# Patient Record
Sex: MALE | URBAN - METROPOLITAN AREA
[De-identification: names, ages, dates, MRNs, and addresses within clinical notes are randomized per-mention and may not be internally consistent; named-entity substitution may affect disease eponyms.]

---

## 2024-08-02 ENCOUNTER — APPOINTMENT (OUTPATIENT)
Dept: ORTHOPEDIC SURGERY | Facility: CLINIC | Age: 74
End: 2024-08-02
Payer: COMMERCIAL

## 2024-08-02 ENCOUNTER — HOSPITAL ENCOUNTER (OUTPATIENT)
Dept: RADIOLOGY | Facility: CLINIC | Age: 74
Discharge: HOME | End: 2024-08-02
Payer: MEDICARE

## 2024-08-02 DIAGNOSIS — M54.50 LOW BACK PAIN, UNSPECIFIED BACK PAIN LATERALITY, UNSPECIFIED CHRONICITY, UNSPECIFIED WHETHER SCIATICA PRESENT: ICD-10-CM

## 2024-08-02 DIAGNOSIS — M48.07 SPINAL STENOSIS OF LUMBOSACRAL REGION: Primary | ICD-10-CM

## 2024-08-02 PROCEDURE — 72110 X-RAY EXAM L-2 SPINE 4/>VWS: CPT | Performed by: RADIOLOGY

## 2024-08-02 PROCEDURE — 72110 X-RAY EXAM L-2 SPINE 4/>VWS: CPT

## 2024-08-02 PROCEDURE — 99204 OFFICE O/P NEW MOD 45 MIN: CPT | Performed by: EMERGENCY MEDICINE

## 2024-08-02 NOTE — PROGRESS NOTES
Subjective   Jaspreet Martínez is a 74 y.o. male who presents for Pain and Sciatica of the Lower Back    HPI  75 yo presenting to [facility] for evaluation of unchanged lower lumbar back discomfort that's been a chronic issue for pt, he was most recently seen approximately 1.5 yrs ago by  pain management and has undergone what sounds like facet injections of both sides of his lumbar spine, as well as radio frequency ablations bilaterally.  He reports prior to those interventions he was seeing an orthopedic surgeon who was doing lumbar spine injections in clinic and he was getting maybe 3 to 5 weeks of relief.  He reports he got minimal relief from the radiofrequency ablation, less than a week.  He is presenting today for persistent but unchanged symptoms looking to get another spine injection.  He has never tried physical therapy.  He reports taking Aleve approximately every other day.  He denies any night pain, numbness weakness bowel or bladder dysfunction.      ROS: All pertinent positive symptoms are included in the history of present illness.    All other systems have been reviewed and are negative and noncontributory to this patient's current ailments.    Objective     There were no vitals filed for this visit.    Physical Exam  [GENERAL]: Comfortable, in no acute distress, not toxic appearing  [NEURO]: Awake, alert, moves all extremities spontaneously and without difficulty  [EYES]: EOMI and nonpainful, pupils normal size and symmetric  [ENMT]: Moist mucous membranes.  [CV]: Well-perfused extremities, no peripheral edema, no asymmetrical extremity edema  [RESP]: Unlabored respiratory effort, no distress or tachypnea, no retractions  [ABD]: Soft, nondistended, no guarding.  [MSK]:     Lumbar Spine Exam:  Normal gait  No gross spinal deformity observed.  No midline ttp. No step-offs.  NTTP along paraspinals, gluteal muscles, SI joint  Normal flexion and extension. Normal hip flexion.   [5]/5 strength of hip  flexion (L2/L3), knee extension (L4), ankle DF (L4), EHL (L5), ankle PF (S1)    Hip Exam:  NTTP over greater trochanter, glute tendons, proximal ITB, ischial tuberosity  Active flexion >90 degrees with grossly normal extension, ,abduction, adduction, IR and ER.  [5]/5 strength of bilateral hip flexion, abduction, & adduction  [ + ] MALENA pain referred to low back/L hip, L>R    IMAGING:  Xrays of lumbar spine obtained on 8/2 reviewed and independently interpreted as:  No acute fractures. No spondylolisthesis. Degenerative changes, especially noted at L3/L4 with decreased intervertebral disc space and noteable degenerative changes to spinous processes.      Assessment/Plan   Problem List Items Addressed This Visit    None  Visit Diagnoses       Low back pain, unspecified back pain laterality, unspecified chronicity, unspecified whether sciatica present        Relevant Orders    XR lumbar spine complete 4+ views          Dx:  Lumbar back pain due to degenerative disc disease    Discussed options for management of this condition and made shared decision making for the selected treatment listed below.    - Today's treatment plan: PT referral. Referral to [facility]. New pain management referral placed.  - Work/exercise limits: No specific restrictions. All activities as tolerated.   - Follow-up: As needed if begins to have any hip discomfort or interested in pursuing CSI for L hip if begins to have pain with activites    All questions answered and patient is agreeable to plan of care.

## 2024-08-29 ENCOUNTER — OFFICE VISIT (OUTPATIENT)
Dept: PAIN MEDICINE | Facility: HOSPITAL | Age: 74
End: 2024-08-29
Payer: MEDICARE

## 2024-08-29 VITALS — BODY MASS INDEX: 22.35 KG/M2 | HEIGHT: 72 IN | WEIGHT: 165 LBS

## 2024-08-29 DIAGNOSIS — M48.07 SPINAL STENOSIS OF LUMBOSACRAL REGION: ICD-10-CM

## 2024-08-29 PROCEDURE — 1125F AMNT PAIN NOTED PAIN PRSNT: CPT | Performed by: ANESTHESIOLOGY

## 2024-08-29 PROCEDURE — 99214 OFFICE O/P EST MOD 30 MIN: CPT | Performed by: ANESTHESIOLOGY

## 2024-08-29 PROCEDURE — 99204 OFFICE O/P NEW MOD 45 MIN: CPT | Performed by: ANESTHESIOLOGY

## 2024-08-29 PROCEDURE — 3008F BODY MASS INDEX DOCD: CPT | Performed by: ANESTHESIOLOGY

## 2024-08-29 PROCEDURE — 1159F MED LIST DOCD IN RCRD: CPT | Performed by: ANESTHESIOLOGY

## 2024-08-29 RX ORDER — ASPIRIN 81 MG/1
81 TABLET ORAL DAILY
COMMUNITY

## 2024-08-29 RX ORDER — VIT A/VIT C/VIT E/ZINC/COPPER 4296-226
CAPSULE ORAL
COMMUNITY

## 2024-08-29 ASSESSMENT — PAIN SCALES - GENERAL: PAINLEVEL: 7

## 2024-08-29 NOTE — PROGRESS NOTES
Chief Complaint   Patient presents with    Back Pain    Extremity Pain     Subjective   Patient ID: Jaspreet Martínez is a 74 y.o. male with a past medical history of chronic lower back pain who presents as a new patient referred by orthopedics for lower back pain.      HPI:   Jaspreet Be is a 74-year-old male with a past medical history of chronic lower back pain who presents as a new patient referred by orthopedics for lower back pain.  Referring physician is Dr. Marquez.  Patient reports that he has had back pain since 2022.  Patient was seeing a pain physician here at [facility] in 2022 in regards to his back pain.  He reports that he had 12 injections in his back at that time and they provided maybe a couple weeks of relief at most.  He also underwent lumbar RFA without any significant relief at that time.  Patient reports that since then he has been able to live with his back pain however over the last 3 to 4 months his back pain is been significantly worse.  Patient reports that his back pain can be as bad as a 8-10 out of 10 pain.  It is both sharp and dull like pain.  It radiates to his bilateral lower extremities right worse than left.  The patient is extremely active and he is an avid golfer golfing multiple times per week.  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  Patient reports that he has trialed gabapentin in the past without any significant relief.  He will sometimes take Aleve and that helps his pain, down but does not take it away.  Patient reports that the pain inhibits his ability to play golf and do his martial arts without significant pain.  He usually is able to fly through however he has noticed that he is not able to perform as well and golf.  Patient also reports that sometimes he has difficulty sleeping at night due to the pain.  Patient reports that his pain is alleviated by leaning forward and he has noticed that when he is  at the grocery store pain is much better when he is leaning on a shopping cart.  Going up stairs is a lot more comfortable for him than going downstairs.  Patient reports that the most comfortable position is sitting and leaning forward or laying flat.  The only blood thinner he takes his aspirin 81.  He does not have any other significant medical problems.  Patient recently saw a Orthopedics/sports medicine, Dr. Marquez, who told him that it spinal stenosis and referred him to us for consideration of mild.    MRI lumbar spine from 2022 demonstrates multilevel moderate canal and neuroforaminal stenosis specifically at L3-L4 and L4-L5 with ligamentum hypertrophy worse at L4-L5.    Physical Therapy:  Patient has completed multiple rounds of physical therapy in the past and continues to do exercises and stretches at home on a daily basis.  Patient is extremely active and thus exercises and stretching with Lithuanian jujitsu  Other Conservative Measures he has tried: TENS, Heating Pad, and Injections  Classes of medications tried in the past: Acetaminophen, NSAIDs, and Topical agents        Review of Systems   13-point ROS done and negative except for HPI.     Current Outpatient Medications   Medication Instructions    aspirin 81 mg, oral, Daily    vitamins A,C,E-zinc-copper (ICaps AREDS) 4,296 mcg-226 mg-90 mg capsule oral       No past medical history on file.     No past surgical history on file.     No family history on file.     Not on File     Objective     There were no vitals filed for this visit.     Physical Exam  General: NAD, well groomed, well nourished  Eyes: Non-icteric sclera, EOMI  Ears, Nose, Mouth, and Throat: External ears and nose appear to be without deformity or rash. No lesions or masses noted. Hearing is grossly intact.   Neck: Trachea midline  Respiratory: Nonlabored breathing   Cardiovascular: no peripheral edema   Skin: No rashes or open lesions/ulcers identified on skin.    Back:   Palpation:  No tenderness to palpation over lumbar paraspinous muscles.   Straight leg raise: does not reproduce their pain, bilaterally   MALENA Maneuver does not reproduce pain bilaterally  Patient reports improvement in pain with flexion of lumbar spine and worsening pain with extension of lumbar spine.  On exam patient was seen sitting in chair leaning slightly forward due to comfort.    Hip: No pain over greater trochanters. and Pain not reproduced with hip internal/external rotation.     Neurologic:   Cranial nerves grossly intact.   Strength 5/5 and symmetric plantar/dorsiflexion   Sensation: Normal to light touch throughout, pinprick  intact throughout.  DTRs:normal and symmetric throughout  Mohr: absent    Psychiatric: Alert, orientation to person, place, and time. Cooperative.    Imaging personally reviewed and independently interpreted:  MRI Lumbar 6/9/22  FINDINGS:     Vertebral body height and alignment is maintained.     There are minimal bulges at each of the L1, L2, and L5 disc levels without  significant thecal sac compression.     There is more moderate disc degeneration with broad-based bulging as well as  facet arthropathy at both L3-4 and L4-5 causing a moderate degree of canal  narrowing and thecal sac compression. Moderate neural foramen narrowing  noted at these levels.     IMPRESSION: Disc bulging with facet arthropathy causing moderate canal and  neural foramen narrowing at L3-4 and L4-5.     Small bulges at L1, L2, and L5.      Assessment/Plan   Jaspreet Martínez is a 74-year-old male with a past medical history of chronic lumbar back pain.  Patient reports that the back pain started 2022 and he was seeing a pain physician here at [facility].  Patient reports that he had 12 injections in his back and a lumbar radiofrequency ablation that did not help significantly with his pain.  Patient reports that he manage to live with his pain for about a year or 2 and then the pain started to return a few months  ago.  Patient reports that the pain is severe 8 out of 10 pain that is worse with lumbar extension and better with lumbar flexion.  Patient reports that he does have some radicular symptoms down his right lower extremity.  On exam the patient does not have any significant findings other than he was seen sitting in a chair leaning forward due to comfort.  He also has significant improvement in his pain with lumbar spinal flexion and worsening pain with lumbar extension.MRI lumbar spine from 2022 demonstrates multilevel moderate canal and neuroforaminal stenosis specifically at L3-L4 and L4-L5 with ligamentum hypertrophy worse at L4-L5.  Patient likely has lumbar spinal stenosis with neurogenic claudication.  Given significant findings on MRI from the 22 of multiple areas of canal stenosis specifically at L3-L4 and L4-L5 with ligamentum hypertrophy worst at L4-L5 we discussed the MILD procedure with the patient.  The patient is extremely interested in this procedure and would like to move forward.  Patient is not wanting to pursue surgery at this time.    Plan:  -We gave the patient information about the MILD procedure, and the patient would like to move forward with scheduling the MILD procedure.    The patient has failed treatment with : Physical therapy , alternative therapies, injections, and have significant limitations of their quality of life due to the pain    We discussed  the risks, benefits and alternatives of the procedure including but not limited to: , Epidural hematoma, Post-dural puncture headache, Lack of efficacy , Transiently worsening pain , Bleeding, Infection , and Nerve Damage    Follow up: After procedure    The patient was invited to contact us back anytime with any questions or concerns and follow-up with us in the office as needed.     Diagnoses and all orders for this visit:  Spinal stenosis of lumbosacral region  -     Referral to Pain Medicine      This note was generated with the aid of  dictation software, there may be typos despite my attempts at proofreading.   The patient was discussed and seen with Dr. Velasquez.  Abundio Sabillon MD  PGY-5  Interventional Pain Fellow

## 2024-09-19 DIAGNOSIS — M54.16 LUMBAR RADICULOPATHY: ICD-10-CM

## 2024-10-07 DIAGNOSIS — G89.18 POSTOPERATIVE PAIN: ICD-10-CM

## 2024-10-10 NOTE — H&P
Pain Management H&P    History Of Present Illness  Jaspreet Martínez is a 74 y.o. male presents for procedure state below. Endorses no changes in past medical history or medical health since last seen in clinic. Patient stopped taking his aspirin 7 days ago.     Past Medical History  He has no past medical history on file.    Surgical History  He has no past surgical history on file.     Social History  He reports that he has been smoking cigarettes. He does not have any smokeless tobacco history on file. No history on file for alcohol use and drug use.    Family History  No family history on file.     Allergies  Patient has no allergy information on record.    Review of Symptoms:   Constitutional: Negative for chills, diaphoresis or fever  HENT: Negative for neck swelling  Eyes:.  Negative for eye pain  Respiratory:.  Negative for cough, shortness of breath or wheezing    Cardiovascular:.  Negative for chest pain or palpitations  Gastrointestinal:.  Negative for abdominal pain, nausea and vomiting  Genitourinary:.  Negative for urgency  Musculoskeletal:  Positive for back pain. Positive for joint pain. Denies falls within the past 3 months.  Skin: Negative for wounds or itching   Neurological: Negative for dizziness, seizures, loss of consciousness and weakness  Endo/Heme/Allergies: Does not bruise/bleed easily  Psychiatric/Behavioral: Negative for depression. The patient does not appear anxious.      Pre-sedation Evaluation  ASA class 2  Mallampati score 2     PHYSICAL EXAM  Vitals signs reviewed  Constitutional:       General: Not in acute distress     Appearance: Normal appearance. Not ill-appearing.  HENT:     Head: Normocephalic and atraumatic  Eyes:     Conjunctiva/sclera: Conjunctivae normal  Cardiovascular:     Rate and Rhythm: Normal rate and regular rhythm  Pulmonary:     Effort: No respiratory distress  Abdominal:     Palpations: Abdomen is soft  Musculoskeletal: CHAVEZ  Skin:     General: Skin is warm and  dry  Neurological:     General: No focal deficit present  Psychiatric:         Mood and Affect: Mood normal         Behavior: Behavior normal     Last Recorded Vitals  BP (!) 187/104   Pulse 79   Temp 36 °C (96.8 °F) (Temporal)   Resp 16   Wt 74.8 kg (165 lb)   SpO2 100%     Relevant Results  Current Outpatient Medications   Medication Instructions    aspirin 81 mg, oral, Daily    vitamins A,C,E-zinc-copper (ICaps AREDS) 4,296 mcg-226 mg-90 mg capsule oral       No results found for this or any previous visit from the past 1000 days.     No image results found.       1. Lumbar radiculopathy  FL pain management    FL pain management    Minimally Invasive Lumbar Decompression (MILD)    Minimally Invasive Lumbar Decompression (MILD)      2. Postoperative pain             ASSESSMENT/PLAN  Jaspreet Martínez is a 74 y.o. male here for MILD procedure    Patient denies any recent antibiotic use or infections, denies any blood thinner use, and denies contrast or local anesthetic allergies     Risks, benefits, alternatives discussed. All questions answered to the best of my ability. Patient agrees to proceed.      Our plan is as follows:  - Proceed with aforementioned procedure          [place] DO   Pain fellow

## 2024-10-11 ENCOUNTER — HOSPITAL ENCOUNTER (OUTPATIENT)
Facility: HOSPITAL | Age: 74
Discharge: HOME | End: 2024-10-11
Payer: MEDICARE

## 2024-10-11 VITALS
HEIGHT: 72 IN | TEMPERATURE: 97.2 F | SYSTOLIC BLOOD PRESSURE: 166 MMHG | BODY MASS INDEX: 22.35 KG/M2 | HEART RATE: 76 BPM | DIASTOLIC BLOOD PRESSURE: 83 MMHG | WEIGHT: 165 LBS | RESPIRATION RATE: 18 BRPM | OXYGEN SATURATION: 97 %

## 2024-10-11 DIAGNOSIS — G89.18 POSTOPERATIVE PAIN: ICD-10-CM

## 2024-10-11 DIAGNOSIS — M54.16 LUMBAR RADICULOPATHY: ICD-10-CM

## 2024-10-11 PROCEDURE — 2500000005 HC RX 250 GENERAL PHARMACY W/O HCPCS: Performed by: ANESTHESIOLOGY

## 2024-10-11 PROCEDURE — 2500000004 HC RX 250 GENERAL PHARMACY W/ HCPCS (ALT 636 FOR OP/ED): Performed by: ANESTHESIOLOGY

## 2024-10-11 PROCEDURE — C1889 IMPLANT/INSERT DEVICE, NOC: HCPCS

## 2024-10-11 PROCEDURE — 0275T HC PERCUT LAMINOTOMY/LAMINECTOMY MILD PROCEDURE: CPT | Performed by: ANESTHESIOLOGY

## 2024-10-11 PROCEDURE — 3700000013 HC SEDATION LEVEL 5+ TIME - EACH ADDITIONAL 15 MINUTES

## 2024-10-11 PROCEDURE — 2550000001 HC RX 255 CONTRASTS: Performed by: ANESTHESIOLOGY

## 2024-10-11 PROCEDURE — 2720000007 HC OR 272 NO HCPCS

## 2024-10-11 PROCEDURE — 3700000012 HC SEDATION LEVEL 5+ TIME - INITIAL 15 MINUTES 5/> YEARS

## 2024-10-11 RX ORDER — LIDOCAINE HYDROCHLORIDE 5 MG/ML
INJECTION, SOLUTION INFILTRATION; INTRAVENOUS
Status: COMPLETED | OUTPATIENT
Start: 2024-10-11 | End: 2024-10-11

## 2024-10-11 RX ORDER — MIDAZOLAM HYDROCHLORIDE 1 MG/ML
INJECTION INTRAMUSCULAR; INTRAVENOUS
Status: DISPENSED
Start: 2024-10-11 | End: 2024-10-11

## 2024-10-11 RX ORDER — FENTANYL CITRATE 50 UG/ML
INJECTION, SOLUTION INTRAMUSCULAR; INTRAVENOUS
Status: DISPENSED
Start: 2024-10-11 | End: 2024-10-11

## 2024-10-11 RX ORDER — DEXAMETHASONE SODIUM PHOSPHATE 10 MG/ML
INJECTION INTRAMUSCULAR; INTRAVENOUS
Status: DISPENSED
Start: 2024-10-11 | End: 2024-10-11

## 2024-10-11 RX ORDER — SODIUM CHLORIDE, SODIUM LACTATE, POTASSIUM CHLORIDE, CALCIUM CHLORIDE 600; 310; 30; 20 MG/100ML; MG/100ML; MG/100ML; MG/100ML
500 INJECTION, SOLUTION INTRAVENOUS
Status: COMPLETED | OUTPATIENT
Start: 2024-10-11 | End: 2024-10-11

## 2024-10-11 RX ORDER — SODIUM CHLORIDE 9 MG/ML
INJECTION, SOLUTION INTRAMUSCULAR; INTRAVENOUS; SUBCUTANEOUS
Status: COMPLETED | OUTPATIENT
Start: 2024-10-11 | End: 2024-10-11

## 2024-10-11 RX ORDER — FENTANYL CITRATE 50 UG/ML
INJECTION, SOLUTION INTRAMUSCULAR; INTRAVENOUS
Status: COMPLETED | OUTPATIENT
Start: 2024-10-11 | End: 2024-10-11

## 2024-10-11 RX ORDER — OXYCODONE HYDROCHLORIDE 5 MG/1
5 TABLET ORAL 3 TIMES DAILY PRN
Qty: 15 TABLET | Refills: 0 | Status: SHIPPED | OUTPATIENT
Start: 2024-10-11 | End: 2024-10-16

## 2024-10-11 RX ORDER — DEXAMETHASONE SODIUM PHOSPHATE 10 MG/ML
INJECTION INTRAMUSCULAR; INTRAVENOUS
Status: COMPLETED | OUTPATIENT
Start: 2024-10-11 | End: 2024-10-11

## 2024-10-11 RX ORDER — SODIUM CHLORIDE 9 MG/ML
INJECTION, SOLUTION INTRAMUSCULAR; INTRAVENOUS; SUBCUTANEOUS
Status: DISPENSED
Start: 2024-10-11 | End: 2024-10-11

## 2024-10-11 RX ORDER — OXYCODONE HYDROCHLORIDE 5 MG/1
5 TABLET ORAL 3 TIMES DAILY PRN
Qty: 15 TABLET | Refills: 0 | OUTPATIENT
Start: 2024-10-11 | End: 2024-10-16

## 2024-10-11 RX ORDER — MIDAZOLAM HYDROCHLORIDE 1 MG/ML
INJECTION, SOLUTION INTRAMUSCULAR; INTRAVENOUS
Status: COMPLETED | OUTPATIENT
Start: 2024-10-11 | End: 2024-10-11

## 2024-10-11 RX ORDER — CEFAZOLIN SODIUM 2 G/100ML
2 INJECTION, SOLUTION INTRAVENOUS EVERY 8 HOURS
Status: DISCONTINUED | OUTPATIENT
Start: 2024-10-11 | End: 2024-10-12 | Stop reason: HOSPADM

## 2024-10-11 RX ORDER — LIDOCAINE HYDROCHLORIDE 5 MG/ML
INJECTION, SOLUTION INFILTRATION; INTRAVENOUS
Status: DISPENSED
Start: 2024-10-11 | End: 2024-10-11

## 2024-10-11 ASSESSMENT — PAIN SCALES - GENERAL
PAINLEVEL_OUTOF10: 0 - NO PAIN
PAINLEVEL_OUTOF10: 0 - NO PAIN
PAINLEVEL_OUTOF10: 4
PAINLEVEL_OUTOF10: 0 - NO PAIN
PAINLEVEL_OUTOF10: 8
PAINLEVEL_OUTOF10: 0 - NO PAIN
PAINLEVEL_OUTOF10: 8
PAINLEVEL_OUTOF10: 0 - NO PAIN

## 2024-10-11 ASSESSMENT — PAIN - FUNCTIONAL ASSESSMENT
PAIN_FUNCTIONAL_ASSESSMENT: WONG-BAKER FACES
PAIN_FUNCTIONAL_ASSESSMENT: WONG-BAKER FACES
PAIN_FUNCTIONAL_ASSESSMENT: 0-10
PAIN_FUNCTIONAL_ASSESSMENT: WONG-BAKER FACES
PAIN_FUNCTIONAL_ASSESSMENT: 0-10
PAIN_FUNCTIONAL_ASSESSMENT: 0-10

## 2024-10-11 ASSESSMENT — COLUMBIA-SUICIDE SEVERITY RATING SCALE - C-SSRS
2. HAVE YOU ACTUALLY HAD ANY THOUGHTS OF KILLING YOURSELF?: NO
6. HAVE YOU EVER DONE ANYTHING, STARTED TO DO ANYTHING, OR PREPARED TO DO ANYTHING TO END YOUR LIFE?: NO
1. IN THE PAST MONTH, HAVE YOU WISHED YOU WERE DEAD OR WISHED YOU COULD GO TO SLEEP AND NOT WAKE UP?: NO

## 2024-10-11 NOTE — DISCHARGE INSTRUCTIONS
DISCHARGE INSTRUCTIONS FOR INJECTIONS     You underwent minimally invasive lumbar decompression today    After most injections, it is recommended that you relax and limit your activity for the remainder of the day unless you have been told otherwise by your pain physician.  You should not drive a car, operate machinery, or make important legal decisions unless otherwise directed by your pain physician.  You may resume your normal activity, including exercise, tomorrow.      Keep a written pain diary of how much pain relief you experienced following the injection procedure and the length of time of pain relief you experienced pain relief. Following diagnostic injections like medial branch nerve blocks, sacroiliac joint blocks, stellate ganglion injections and other blocks, it is very important you record the specific amount of pain relief you experienced immediately after the injectionand how long it lasted. Your doctor will ask you for this information at your follow up visit.     For all injections, please keep the injection site dry and inspect the site for a couple of days. You may remove the Band-Aid the day of the injection at any time.     Some discomfort, bruising or slight swelling may occur at the injection site. This is not abnormal if it occurs.  If needed you may:    -Take over the counter medication such as Tylenol or Motrin.   -Apply an ice pack for 30 minutes, 2 to 3 times a day for the first 24 hours.     You may shower today; no soaking baths, hot tubs, whirlpools or swimming pools for two days.      If you are given steroids in your injection, it may take 3-5 days for the steroid medication to take effect. You may notice a worsening of your symptoms for 1-2 days after the injection. This is not abnormal.  You may use acetaminophen, ibuprofen, or prescription medication that your doctor may have prescribed for you if you need to do so.     A few common side effects of steroids include facial  flushing, sweating, restlessness, irritability,difficulty sleeping, increase in blood sugar, and increased blood pressure. If you have diabetes, please monitor your blood sugar at least once a day for at least 5 days. If you have poorly controlled high blood pressure, monitoryour blood pressure for at least 2 days and contact your primary care physician if these numbers are unusually high for you.      If you take aspirin or non-steroidal anti-inflammatory drugs (examples are Motrin, Advil, ibuprofen, Naprosyn, Voltaren, Relafen, etc.) you may restart these this evening, but stop taking it 3 days before your next appointment, unless instructed otherwiseby your physician.      You do not need to discontinue non-aspirin-containing pain medications prior to an injection (examples: Celebrex, tramadol, hydrocodone and acetaminophen).      If you take a blood thinning medication (Coumadin, Lovenox, Fragmin,Ticlid, Plavix, Pradaxa, etc.), please discuss this with your primary care physician/cardiologist and your pain physician. These medications MUST be discontinued before you can have an injection safely, without the risk of uncontrolled bleeding. If these medications are not discontinued for an appropriate period of time, you will not be able to receivean injection. Please adhere to instructions given to you about when to restart your blood thinning medication. If you have any questions please reach out to our team.    If you are taking Coumadin, please have your INR checked the morning of your procedure and bring the result to your appointment unless otherwise instructed. If your INR is over 1.2, your injection may need to be rescheduled to avoid uncontrolled bleeding from the needle placement.     Call [facility]  and ask for Pain Management at [phone] between 8am-4pm Monday - Friday if you are experiencing the following:    If you received an epidural or spinal injection:    -Headache that doesnot go away with  medicine, is worse when sitting or standing up, and is greatly relieved upon lying down.   -Severe pain worse than or different than your baseline pain.   -Chills or fever (101º F or greater).   -Drainage or signs of infection at the injection site     Go directly to the Emergency Department if you are experiencing the following and received an epidural or spinal injection:   -Abrupt weakness or progressive weakness in your legs that starts after you leave the clinic.   -Abrupt severe or worsening numbness in your legs.   -Inability to urinate after the injection or loss of bowel or bladder control without the urge to defecate or urinate.     If you have a clinical question that cannot wait until your next appointment, please call [phone] between 8am-4pm Monday - Friday or send a [facility] message. We do our best to return all non-emergency messages within 24 hours, Monday - Friday. A nurse or physician will return your message. You may also try calling Dr. Ron Howell's nurse ([phone]), and they will do their best to answer your question(s).    If you need to cancel an appointment, please call the scheduling staff at [phone] during normal business hours or leave a message at least 24 hours in advance.     If you are going to be sedated for your next procedure, you MUST have responsible adult who can legally drive accompany you home. You cannot eat or drink for at least eight hours prior to the planned procedure if you are going to receive sedation. You may take your non-blood thinning medications with a small sip of water.

## 2024-10-31 DIAGNOSIS — M54.16 LUMBAR RADICULOPATHY: ICD-10-CM

## 2024-11-14 DIAGNOSIS — M54.16 LUMBAR RADICULOPATHY: ICD-10-CM

## 2024-12-06 ENCOUNTER — HOSPITAL ENCOUNTER (OUTPATIENT)
Facility: HOSPITAL | Age: 74
Discharge: HOME | End: 2024-12-06
Payer: MEDICARE

## 2024-12-06 VITALS
DIASTOLIC BLOOD PRESSURE: 105 MMHG | WEIGHT: 165 LBS | RESPIRATION RATE: 16 BRPM | TEMPERATURE: 98.8 F | HEART RATE: 95 BPM | BODY MASS INDEX: 22.35 KG/M2 | HEIGHT: 72 IN | OXYGEN SATURATION: 96 % | SYSTOLIC BLOOD PRESSURE: 183 MMHG

## 2024-12-06 DIAGNOSIS — M54.16 LUMBAR RADICULOPATHY: ICD-10-CM

## 2024-12-06 PROCEDURE — 2500000004 HC RX 250 GENERAL PHARMACY W/ HCPCS (ALT 636 FOR OP/ED): Performed by: ANESTHESIOLOGY

## 2024-12-06 PROCEDURE — 2550000001 HC RX 255 CONTRASTS: Performed by: ANESTHESIOLOGY

## 2024-12-06 PROCEDURE — 2720000007 HC OR 272 NO HCPCS

## 2024-12-06 PROCEDURE — 64483 NJX AA&/STRD TFRM EPI L/S 1: CPT | Performed by: ANESTHESIOLOGY

## 2024-12-06 PROCEDURE — 64483 NJX AA&/STRD TFRM EPI L/S 1: CPT | Mod: 50 | Performed by: ANESTHESIOLOGY

## 2024-12-06 PROCEDURE — 2500000005 HC RX 250 GENERAL PHARMACY W/O HCPCS: Performed by: ANESTHESIOLOGY

## 2024-12-06 RX ORDER — SODIUM CHLORIDE 9 MG/ML
INJECTION, SOLUTION INTRAMUSCULAR; INTRAVENOUS; SUBCUTANEOUS
Status: DISCONTINUED
Start: 2024-12-06 | End: 2024-12-07 | Stop reason: HOSPADM

## 2024-12-06 RX ORDER — SODIUM CHLORIDE 9 MG/ML
INJECTION, SOLUTION INTRAMUSCULAR; INTRAVENOUS; SUBCUTANEOUS
Status: COMPLETED | OUTPATIENT
Start: 2024-12-06 | End: 2024-12-06

## 2024-12-06 RX ORDER — DEXAMETHASONE SODIUM PHOSPHATE 10 MG/ML
INJECTION INTRAMUSCULAR; INTRAVENOUS
Status: DISCONTINUED
Start: 2024-12-06 | End: 2024-12-07 | Stop reason: HOSPADM

## 2024-12-06 RX ORDER — LIDOCAINE HYDROCHLORIDE 5 MG/ML
INJECTION, SOLUTION INFILTRATION; INTRAVENOUS
Status: COMPLETED | OUTPATIENT
Start: 2024-12-06 | End: 2024-12-06

## 2024-12-06 RX ORDER — DEXAMETHASONE SODIUM PHOSPHATE 10 MG/ML
INJECTION INTRAMUSCULAR; INTRAVENOUS
Status: COMPLETED | OUTPATIENT
Start: 2024-12-06 | End: 2024-12-06

## 2024-12-06 RX ORDER — LIDOCAINE HYDROCHLORIDE 5 MG/ML
INJECTION, SOLUTION INFILTRATION; INTRAVENOUS
Status: DISCONTINUED
Start: 2024-12-06 | End: 2024-12-07 | Stop reason: HOSPADM

## 2024-12-06 ASSESSMENT — COLUMBIA-SUICIDE SEVERITY RATING SCALE - C-SSRS
1. IN THE PAST MONTH, HAVE YOU WISHED YOU WERE DEAD OR WISHED YOU COULD GO TO SLEEP AND NOT WAKE UP?: NO
2. HAVE YOU ACTUALLY HAD ANY THOUGHTS OF KILLING YOURSELF?: NO
6. HAVE YOU EVER DONE ANYTHING, STARTED TO DO ANYTHING, OR PREPARED TO DO ANYTHING TO END YOUR LIFE?: NO

## 2024-12-06 ASSESSMENT — PAIN SCALES - GENERAL
PAINLEVEL_OUTOF10: 0 - NO PAIN
PAINLEVEL_OUTOF10: 5 - MODERATE PAIN
PAINLEVEL_OUTOF10: 10 - WORST POSSIBLE PAIN
PAINLEVEL_OUTOF10: 5 - MODERATE PAIN

## 2024-12-06 ASSESSMENT — PAIN - FUNCTIONAL ASSESSMENT
PAIN_FUNCTIONAL_ASSESSMENT: 0-10
PAIN_FUNCTIONAL_ASSESSMENT: WONG-BAKER FACES
PAIN_FUNCTIONAL_ASSESSMENT: WONG-BAKER FACES
PAIN_FUNCTIONAL_ASSESSMENT: 0-10

## 2024-12-06 NOTE — H&P
HISTORY AND PHYSICAL    History Of Present Illness  Jaspreet Martínez is a 74 y.o. male presenting with chronic pain here for procedure as stated below. Patient denies any changes to health since the last visit to our clinic.    Past Medical History  No past medical history on file.    Surgical History  No past surgical history on file.     Social History  He reports that he has been smoking cigarettes. He does not have any smokeless tobacco history on file. No history on file for alcohol use and drug use.    Family History  No family history on file.     Allergies  Patient has no allergy information on record.    Review of Systems  12 point ROS done and negative except for the above.     Physical Exam  General: NAD, well groomed, well nourished  Eyes: Non-icteric sclera, EOMI  Ears, Nose, Mouth, and Throat: External ears and nose appear to be without deformity or rash. No lesions or masses noted. Hearing is grossly intact.   Neck: Trachea midline  Respiratory: Nonlabored breathing   Cardiovascular: No peripheral edema   Skin: No rashes or open lesions/ulcers identified on skin.      Last Recorded Vitals  There were no vitals taken for this visit.    Relevant Results  Current Outpatient Medications   Medication Instructions    aspirin 81 mg, oral, Daily    vitamins A,C,E-zinc-copper (ICaps AREDS) 4,296 mcg-226 mg-90 mg capsule oral       No results found for this or any previous visit from the past 1000 days.     Minimally Invasive Lumbar Decompression (MILD)  Table formatting from the original result was not included.  Procedure  Minimally Invasive Lumbar Decompression (Mild)    Indication  Lumbar radiculopathy    Medications  fentaNYL PF (Sublimaze) injection 100 mcg   sodium chloride (PF) 0.9% solution 3 mL   dexAMETHasone (PF) (Decadron) injection 10 mg   lidocaine PF (Xylocaine) 5 mg/mL (0.5 %) injection 20 mL   midazolam (Versed) injection 2 mg   iohexol (OMNIPaque) 240 mg iodine/mL solution 1 mL   (Totals for  administrations occurring from 1115 to 1208 on 10/11/24)     Preprocedure  A history and physical has been performed, and patient medication   allergies have been reviewed. The patient's tolerance of previous   anesthesia has been reviewed. The risks and benefits of the procedure and   the sedation options and risks were discussed with the patient. All   questions were answered and informed consent obtained.    Details of the Procedure  Preoperative diagnosis:  Lumbar stenosis with neurogenic claudication  Postoperative diagnosis:  Lumbar stenosis with neurogenic claudication  Procedure: Minimally invasive lumbar decompression under fluoroscopic   guidance  Surgeon: Sheila Velasquez  Assistant:  Fellow, Dre Grove  Anesthesia: Local, plus midazolam 50 mcg and fentanyl 100 mcg  Complications: Apparently none    Clinical note: Mr. Martínez is a 74-year-old male with a history of back   pain and symptoms most consistent with lumbar spinal stenosis with   neurogenic claudication.  Patient presents today for a mild procedure.    Procedure note: The patient in the preoperative holding area after   specific alternatives procedure discussed with the patient, informed   consent was then obtained. The patient was brought back to the procedure   room and placed in the prone position on the fluoroscopy table. The area   over the thoracolumbar spine was exposed, prepped, draped in the usual   sterile fashion. The intralaminar was identified at L3-4 and L4-5. Skin   and subcutaneous tissues were anesthetized using 0.5% lidocaine to the   lamina at L4 and L5 bilaterally. A horizontal 5 mm incision was made. The   trocar and portal were inserted and advanced and placed in a paramedian   approach on the lamina of L5 first on the left side. A contralateral   oblique view was obtained and the positioning was optimized. The trocar   was removed and bone rongeur was used to address the inferior and superior   lamina. A tissue sculptor was  used for the ligament.  A significant amount   of bone and ligament was removed from each side.  This procedure was done   bilaterally at L4-5 and L3-4.   All instruments were removed.  Attention   was then taken to doing an epidural injection at the L5-S1 level.  An   18-gauge Tuohy needle was inserted through the existing incision.  A glass   syringe was used for the loss-of-resistance technique in the lateral view.    Thereafter, contrast was injected in the AP and lateral views which   showed appropriate spread.  3 mLs of preservative-free normal saline mixed   with 10 mg dexamethasone was injected.  Needle removed.  Mastisol, 4 x 4,   and sterile Tegaderms were placed at the skin.  The patient was taken the   recovery room in stable condition and appropriate postprocedure   instructions were given.    Procedure Provider  Sheila Velasquez MD    Procedure Location  [facility]  [address] [address] [phone]    Referring Provider  Sheila Velasquez MD  [id number] [facility]  Department Of Anesthesiology [place] [address]     No diagnosis found.        Assessment/Plan   Jaspreet Martínez is a 74 y.o. male presenting with chronic pain here for Bilateral lumbar transforaminal epidural steroid injections at the L5 level; he denies any recent antibiotic use or infections, he denies any blood thinner use , and he denies contrast or local anesthetic allergies.     ASA PS Classification: 3  Mallampati score: 2    Plan:  - We will proceed with the procedure as above. We discussed extensively the risks, benefits, and alternatives to the procedure. The patient's questions were addressed and answered in detail. The patient demonstrated understanding of the procedure, and is amenable to proceeding with it. The Risks of the procedure that were discussed with the patient include but are not limited to the following: A lack of efficacy,  transient worsening of pain, bleeding, infection, nerve injury, nerve damage, neuritis or sunburn sensation. Informed consent obtained as attached to EMR.  - Patient to continue physician directed home exercises as tolerated.  - Patient will follow-up with us in clinic.      Joanna Arceo MD  Chronic Pain Fellow  [facility]

## 2024-12-06 NOTE — DISCHARGE INSTRUCTIONS
DISCHARGE INSTRUCTIONS FOR INJECTIONS     You underwent bilateral lumbar trans-foraminal epidural steroid injection today    After most injections, it is recommended that you relax and limit your activity for the remainder of the day unless you have been told otherwise by your pain physician.  You should not drive a car, operate machinery, or make important legal decisions unless otherwise directed by your pain physician.  You may resume your normal activity, including exercise, tomorrow.      Keep a written pain diary of how much pain relief you experienced following the injection procedure and the length of time of pain relief you experienced pain relief. Following diagnostic injections like medial branch nerve blocks, sacroiliac joint blocks, stellate ganglion injections and other blocks, it is very important you record the specific amount of pain relief you experienced immediately after the injectionand how long it lasted. Your doctor will ask you for this information at your follow up visit.     For all injections, please keep the injection site dry and inspect the site for a couple of days. You may remove the Band-Aid the day of the injection at any time.     Some discomfort, bruising or slight swelling may occur at the injection site. This is not abnormal if it occurs.  If needed you may:    -Take over the counter medication such as Tylenol or Motrin.   -Apply an ice pack for 30 minutes, 2 to 3 times a day for the first 24 hours.     You may shower today; no soaking baths, hot tubs, whirlpools or swimming pools for two days.      If you are given steroids in your injection, it may take 3-5 days for the steroid medication to take effect. You may notice a worsening of your symptoms for 1-2 days after the injection. This is not abnormal.  You may use acetaminophen, ibuprofen, or prescription medication that your doctor may have prescribed for you if you need to do so.     A few common side effects of steroids  include facial flushing, sweating, restlessness, irritability,difficulty sleeping, increase in blood sugar, and increased blood pressure. If you have diabetes, please monitor your blood sugar at least once a day for at least 5 days. If you have poorly controlled high blood pressure, monitoryour blood pressure for at least 2 days and contact your primary care physician if these numbers are unusually high for you.      If you take aspirin or non-steroidal anti-inflammatory drugs (examples are Motrin, Advil, ibuprofen, Naprosyn, Voltaren, Relafen, etc.) you may restart these this evening, but stop taking it 3 days before your next appointment, unless instructed otherwiseby your physician.      You do not need to discontinue non-aspirin-containing pain medications prior to an injection (examples: Celebrex, tramadol, hydrocodone and acetaminophen).      If you take a blood thinning medication (Coumadin, Lovenox, Fragmin,Ticlid, Plavix, Pradaxa, etc.), please discuss this with your primary care physician/cardiologist and your pain physician. These medications MUST be discontinued before you can have an injection safely, without the risk of uncontrolled bleeding. If these medications are not discontinued for an appropriate period of time, you will not be able to receivean injection. Please adhere to instructions given to you about when to restart your blood thinning medication. If you have any questions please reach out to our team.    If you are taking Coumadin, please have your INR checked the morning of your procedure and bring the result to your appointment unless otherwise instructed. If your INR is over 1.2, your injection may need to be rescheduled to avoid uncontrolled bleeding from the needle placement.     Call [facility]  and ask for Pain Management at [phone] between 8am-4pm Monday - Friday if you are experiencing the following:    If you received an epidural or spinal injection:    -Headache that doesnot  go away with medicine, is worse when sitting or standing up, and is greatly relieved upon lying down.   -Severe pain worse than or different than your baseline pain.   -Chills or fever (101º F or greater).   -Drainage or signs of infection at the injection site     Go directly to the Emergency Department if you are experiencing the following and received an epidural or spinal injection:   -Abrupt weakness or progressive weakness in your legs that starts after you leave the clinic.   -Abrupt severe or worsening numbness in your legs.   -Inability to urinate after the injection or loss of bowel or bladder control without the urge to defecate or urinate.     If you have a clinical question that cannot wait until your next appointment, please call [phone] between 8am-4pm Monday - Friday or send a Action message. We do our best to return all non-emergency messages within 24 hours, Monday - Friday. A nurse or physician will return your message. You may also the appropriate nurse below, and they will do their best to answer your questions.  - For Dr. Velasquez, call Lynda at [phone]  - For Dr. Aiken, call Akilah at [phone]  - For Dr. Kenney, call Akilah at [phone]  - For Dr. Porras, call Summer at [phone]  - For Dr. Valderrama, call Summer at [phone]    If you need to cancel an appointment, please call the scheduling staff at [phone] during normal business hours or leave a message at least 24 hours in advance.     If you are going to be sedated for your next procedure, you MUST have responsible adult who can legally drive accompany you home. You cannot eat or drink for at least eight hours prior to the planned procedure if you are going to receive sedation. You may take your non-blood thinning medications with a small sip of water.

## 2024-12-06 NOTE — Clinical Note
Dressing applied to insertion site. From: Schuyler Guevara  To: Rudolph Lynch PA-C  Sent: 9/23/2019 6:32 PM CDT  Subject: Non-Urgent Medical Question    Anshu Morales,   I hope you are doing well. I was wondering if you could put an order in for a Urinalysis?  I do not have any burning o

## 2024-12-16 ENCOUNTER — APPOINTMENT (OUTPATIENT)
Facility: HOSPITAL | Age: 74
End: 2024-12-16
Payer: MEDICARE

## 2025-01-10 ENCOUNTER — OFFICE VISIT (OUTPATIENT)
Dept: URGENT CARE | Age: 75
End: 2025-01-10
Payer: MEDICARE

## 2025-01-10 ENCOUNTER — APPOINTMENT (OUTPATIENT)
Dept: URGENT CARE | Age: 75
End: 2025-01-10
Payer: MEDICARE

## 2025-01-10 ENCOUNTER — ANCILLARY PROCEDURE (OUTPATIENT)
Dept: URGENT CARE | Age: 75
End: 2025-01-10
Payer: MEDICARE

## 2025-01-10 VITALS
WEIGHT: 160 LBS | RESPIRATION RATE: 22 BRPM | HEIGHT: 72 IN | DIASTOLIC BLOOD PRESSURE: 102 MMHG | SYSTOLIC BLOOD PRESSURE: 180 MMHG | HEART RATE: 115 BPM | BODY MASS INDEX: 21.67 KG/M2 | TEMPERATURE: 98.5 F | OXYGEN SATURATION: 94 %

## 2025-01-10 DIAGNOSIS — R68.89 FLU-LIKE SYMPTOMS: ICD-10-CM

## 2025-01-10 DIAGNOSIS — R06.09 DOE (DYSPNEA ON EXERTION): Primary | ICD-10-CM

## 2025-01-10 DIAGNOSIS — R06.02 SOB (SHORTNESS OF BREATH): ICD-10-CM

## 2025-01-10 DIAGNOSIS — R05.1 ACUTE COUGH: ICD-10-CM

## 2025-01-10 DIAGNOSIS — R05.9 COUGH: ICD-10-CM

## 2025-01-10 DIAGNOSIS — J18.9 PNEUMONIA OF LEFT LUNG DUE TO INFECTIOUS ORGANISM, UNSPECIFIED PART OF LUNG: ICD-10-CM

## 2025-01-10 DIAGNOSIS — J18.9 PNEUMONIA OF RIGHT UPPER LOBE DUE TO INFECTIOUS ORGANISM: ICD-10-CM

## 2025-01-10 LAB
POC RAPID INFLUENZA A: NEGATIVE
POC RAPID INFLUENZA B: NEGATIVE
POC SARS-COV-2 AG BINAX: NORMAL

## 2025-01-10 PROCEDURE — 87811 SARS-COV-2 COVID19 W/OPTIC: CPT | Performed by: PHYSICIAN ASSISTANT

## 2025-01-10 PROCEDURE — 99204 OFFICE O/P NEW MOD 45 MIN: CPT | Performed by: PHYSICIAN ASSISTANT

## 2025-01-10 PROCEDURE — 71046 X-RAY EXAM CHEST 2 VIEWS: CPT | Performed by: PHYSICIAN ASSISTANT

## 2025-01-10 PROCEDURE — 3008F BODY MASS INDEX DOCD: CPT | Performed by: PHYSICIAN ASSISTANT

## 2025-01-10 PROCEDURE — 1159F MED LIST DOCD IN RCRD: CPT | Performed by: PHYSICIAN ASSISTANT

## 2025-01-10 PROCEDURE — 87804 INFLUENZA ASSAY W/OPTIC: CPT | Performed by: PHYSICIAN ASSISTANT

## 2025-01-10 PROCEDURE — 1160F RVW MEDS BY RX/DR IN RCRD: CPT | Performed by: PHYSICIAN ASSISTANT

## 2025-01-10 RX ORDER — AZITHROMYCIN 250 MG/1
TABLET, FILM COATED ORAL
Qty: 6 TABLET | Refills: 0 | Status: SHIPPED | OUTPATIENT
Start: 2025-01-10

## 2025-01-10 RX ORDER — BENZONATATE 200 MG/1
200 CAPSULE ORAL 3 TIMES DAILY PRN
Qty: 30 CAPSULE | Refills: 0 | Status: SHIPPED | OUTPATIENT
Start: 2025-01-10

## 2025-01-10 RX ORDER — ALBUTEROL SULFATE 90 UG/1
2 INHALANT RESPIRATORY (INHALATION) EVERY 4 HOURS PRN
Qty: 8.5 G | Refills: 0 | Status: SHIPPED | OUTPATIENT
Start: 2025-01-10 | End: 2026-01-10

## 2025-01-10 RX ORDER — AMOXICILLIN AND CLAVULANATE POTASSIUM 875; 125 MG/1; MG/1
875 TABLET, FILM COATED ORAL 2 TIMES DAILY
Qty: 20 TABLET | Refills: 0 | Status: SHIPPED | OUTPATIENT
Start: 2025-01-10 | End: 2025-01-20

## 2025-01-10 ASSESSMENT — ENCOUNTER SYMPTOMS
RHINORRHEA: 0
COUGH: 1
FEVER: 0
SHORTNESS OF BREATH: 1
DIAPHORESIS: 0
CHILLS: 0
SORE THROAT: 0

## 2025-01-10 NOTE — PROGRESS NOTES
Subjective   Patient ID: Jaspreet Martínez is a 74 y.o. male. They present today with a chief complaint of Shortness of Breath (Feeling winded, loss of appetite, no taste since Monday, some coughing, difficulty to take a deep breathe).    History of Present Illness  Productive cough, MEJIAS, right ear started Monday.     Denies fever, chills, sweats, nasal congestion/rhinorrhea, chest pain, sore throat.    Smokes <pack/day.       Shortness of Breath  Associated symptoms: cough and ear pain    Associated symptoms: no chest pain, no diaphoresis, no fever and no sore throat        Past Medical History  Allergies as of 01/10/2025    (No Known Allergies)       (Not in a hospital admission)       History reviewed. No pertinent past medical history.    History reviewed. No pertinent surgical history.     reports that he has been smoking cigarettes. He has never used smokeless tobacco.    Review of Systems  Review of Systems   Constitutional:  Negative for chills, diaphoresis and fever.   HENT:  Positive for ear pain. Negative for congestion, rhinorrhea and sore throat.    Respiratory:  Positive for cough and shortness of breath.    Cardiovascular:  Negative for chest pain.   All other systems reviewed and are negative.                                 Objective    Vitals:    01/10/25 1436   BP: (!) 180/102   BP Location: Left arm   Patient Position: Sitting   BP Cuff Size: Small adult   Pulse: (!) 115   Resp: 22   Temp: 36.9 °C (98.5 °F)   TempSrc: Oral   SpO2: 94%   Weight: 72.6 kg (160 lb)   Height: 1.829 m (6')     No LMP for male patient.    Physical Exam  Vitals and nursing note reviewed.   Constitutional:       General: He is not in acute distress.  HENT:      Right Ear: Tympanic membrane normal.      Left Ear: Tympanic membrane normal.      Nose: Congestion and rhinorrhea present.   Cardiovascular:      Rate and Rhythm: Normal rate and regular rhythm.      Heart sounds: Normal heart sounds.   Pulmonary:      Effort:  Pulmonary effort is normal.      Comments: Decreased breath sounds right LL.  Neurological:      Mental Status: He is alert.         Procedures    Point of Care Test & Imaging Results from this visit  No results found for this visit on 01/10/25.   No results found.    Diagnostic study results (if any) were reviewed by Maribell Lacy PA-C.    Assessment/Plan   Allergies, medications, history, and pertinent labs/EKGs/Imaging reviewed by Maribell Lacy PA-C.     Orders and Diagnoses  Diagnoses and all orders for this visit:  Flu-like symptoms  -     POCT Influenza A/B manually resulted  -     POCT Covid-19 Rapid Antigen      Medical Admin Record      Patient disposition: Home    Electronically signed by Maribell Lacy PA-C  2:48 PM

## 2025-01-10 NOTE — PATIENT INSTRUCTIONS
Use albuterol inhaler for shortness of breath    Take benzonatate for cough.    Finish all antibiotics, azithromycin and augmentin.    Go to emergency department for worsening symptoms or concerns.     Follow up with your primary care next week.